# Patient Record
Sex: FEMALE | Race: WHITE | Employment: FULL TIME | URBAN - METROPOLITAN AREA
[De-identification: names, ages, dates, MRNs, and addresses within clinical notes are randomized per-mention and may not be internally consistent; named-entity substitution may affect disease eponyms.]

---

## 2017-10-16 ENCOUNTER — LAB ENCOUNTER (OUTPATIENT)
Dept: LAB | Age: 72
End: 2017-10-16
Attending: INTERNAL MEDICINE
Payer: MEDICARE

## 2017-10-16 DIAGNOSIS — R53.83 FATIGUE, UNSPECIFIED TYPE: ICD-10-CM

## 2017-10-16 DIAGNOSIS — Z12.31 ENCOUNTER FOR SCREENING MAMMOGRAM FOR MALIGNANT NEOPLASM OF BREAST: ICD-10-CM

## 2017-10-16 DIAGNOSIS — H00.025 HORDEOLUM INTERNUM OF LEFT LOWER EYELID: ICD-10-CM

## 2017-10-16 DIAGNOSIS — Z23 NEED FOR VACCINATION WITH 13-POLYVALENT PNEUMOCOCCAL CONJUGATE VACCINE: ICD-10-CM

## 2017-10-16 PROCEDURE — 80061 LIPID PANEL: CPT

## 2017-10-16 PROCEDURE — 83540 ASSAY OF IRON: CPT

## 2017-10-16 PROCEDURE — 36415 COLL VENOUS BLD VENIPUNCTURE: CPT

## 2017-10-16 PROCEDURE — 82607 VITAMIN B-12: CPT

## 2017-10-16 PROCEDURE — 84439 ASSAY OF FREE THYROXINE: CPT

## 2017-10-16 PROCEDURE — 84443 ASSAY THYROID STIM HORMONE: CPT

## 2017-10-16 PROCEDURE — 80053 COMPREHEN METABOLIC PANEL: CPT

## 2017-10-16 PROCEDURE — 84466 ASSAY OF TRANSFERRIN: CPT

## 2017-10-16 PROCEDURE — 82746 ASSAY OF FOLIC ACID SERUM: CPT

## 2017-10-16 PROCEDURE — 85025 COMPLETE CBC W/AUTO DIFF WBC: CPT

## 2017-10-17 NOTE — PROGRESS NOTES
[phone] ([place])   Spoke with patient and informed of MD instructions. Patient verbalized understanding.

## 2017-12-21 PROBLEM — J45.41 MODERATE PERSISTENT ASTHMA WITH ACUTE EXACERBATION: Status: ACTIVE | Noted: 2017-12-21

## 2018-04-05 PROCEDURE — 87086 URINE CULTURE/COLONY COUNT: CPT | Performed by: INTERNAL MEDICINE

## 2018-04-05 PROCEDURE — 87186 SC STD MICRODIL/AGAR DIL: CPT | Performed by: INTERNAL MEDICINE

## 2018-04-05 PROCEDURE — 87088 URINE BACTERIA CULTURE: CPT | Performed by: INTERNAL MEDICINE

## 2018-04-23 PROCEDURE — 87186 SC STD MICRODIL/AGAR DIL: CPT | Performed by: INTERNAL MEDICINE

## 2018-04-23 PROCEDURE — 87086 URINE CULTURE/COLONY COUNT: CPT | Performed by: INTERNAL MEDICINE

## 2018-04-23 PROCEDURE — 87088 URINE BACTERIA CULTURE: CPT | Performed by: INTERNAL MEDICINE

## 2018-05-14 ENCOUNTER — OFFICE VISIT (OUTPATIENT)
Dept: OBGYN CLINIC | Facility: CLINIC | Age: 73
End: 2018-05-14

## 2018-05-14 VITALS
BODY MASS INDEX: 29 KG/M2 | WEIGHT: 146 LBS | DIASTOLIC BLOOD PRESSURE: 82 MMHG | HEART RATE: 82 BPM | SYSTOLIC BLOOD PRESSURE: 132 MMHG

## 2018-05-14 DIAGNOSIS — N90.89 VULVAR IRRITATION: Primary | ICD-10-CM

## 2018-05-14 PROCEDURE — 99213 OFFICE O/P EST LOW 20 MIN: CPT | Performed by: OBSTETRICS & GYNECOLOGY

## 2018-05-14 NOTE — PROGRESS NOTES
HPI:    Patient ID: Leena Osborn is a 81year old female. HPI  GYN problem visit  Sent by her primary care physician Binta Cheung  Patient I have not seen in many years. Complains of vulvar irritation.   Approximately a month ago she began to have sympt VOMITING    HISTORY:  Past Medical History:   Diagnosis Date   • ASD (atrial septal defect)    • Asthma    • Essential hypertension    • Hypothyroid     due to Graves, treated with I131      Past Surgical History:  No date: HERNIA SURGERY  No date: HYSTERE

## 2019-01-07 PROBLEM — N90.89 VULVAR IRRITATION: Status: RESOLVED | Noted: 2018-05-14 | Resolved: 2019-01-07

## 2019-01-07 PROBLEM — J45.909 ASTHMA: Status: ACTIVE | Noted: 2019-01-07

## 2019-01-07 PROBLEM — J45.41 MODERATE PERSISTENT ASTHMA WITH ACUTE EXACERBATION: Status: RESOLVED | Noted: 2017-12-21 | Resolved: 2019-01-07

## 2019-01-07 PROBLEM — I70.0 AORTIC ATHEROSCLEROSIS (HCC): Status: ACTIVE | Noted: 2019-01-07

## 2019-10-04 PROBLEM — R53.83 FATIGUE, UNSPECIFIED TYPE: Status: ACTIVE | Noted: 2019-10-04

## 2019-12-09 PROBLEM — H53.9 VISUAL DISTURBANCES: Status: ACTIVE | Noted: 2019-12-09

## 2019-12-10 ENCOUNTER — LAB ENCOUNTER (OUTPATIENT)
Dept: LAB | Age: 74
End: 2019-12-10
Attending: INTERNAL MEDICINE
Payer: MEDICARE

## 2019-12-10 DIAGNOSIS — Z13.220 ENCOUNTER FOR SCREENING FOR LIPID DISORDER: ICD-10-CM

## 2019-12-10 DIAGNOSIS — R53.83 FATIGUE, UNSPECIFIED TYPE: ICD-10-CM

## 2019-12-10 DIAGNOSIS — I10 ESSENTIAL HYPERTENSION: ICD-10-CM

## 2019-12-10 PROCEDURE — 85652 RBC SED RATE AUTOMATED: CPT

## 2019-12-10 PROCEDURE — 80053 COMPREHEN METABOLIC PANEL: CPT

## 2019-12-10 PROCEDURE — 85025 COMPLETE CBC W/AUTO DIFF WBC: CPT

## 2019-12-10 PROCEDURE — 36415 COLL VENOUS BLD VENIPUNCTURE: CPT

## 2019-12-10 PROCEDURE — 86140 C-REACTIVE PROTEIN: CPT

## 2019-12-10 PROCEDURE — 80061 LIPID PANEL: CPT

## 2020-01-20 PROBLEM — Z87.74 H/O CONGENITAL ATRIAL SEPTAL DEFECT (ASD) REPAIR: Status: ACTIVE | Noted: 2020-01-20

## 2020-09-19 PROBLEM — E03.9 HYPOTHYROIDISM, UNSPECIFIED TYPE: Status: ACTIVE | Noted: 2020-09-19

## 2020-10-29 PROBLEM — Z87.74 HISTORY OF ATRIAL SEPTAL DEFECT REPAIR: Status: ACTIVE | Noted: 2020-01-20

## 2020-11-01 PROBLEM — R63.5 WEIGHT GAIN: Status: ACTIVE | Noted: 2020-11-01

## 2020-11-01 PROBLEM — E78.00 HIGH CHOLESTEROL: Status: ACTIVE | Noted: 2020-11-01

## 2020-11-01 PROBLEM — Z12.31 ENCOUNTER FOR SCREENING MAMMOGRAM FOR MALIGNANT NEOPLASM OF BREAST: Status: ACTIVE | Noted: 2020-11-01

## 2021-03-09 DIAGNOSIS — Z23 NEED FOR VACCINATION: ICD-10-CM

## 2021-04-01 PROBLEM — J44.89 CHRONIC OBSTRUCTIVE ASTHMA: Status: ACTIVE | Noted: 2019-01-07

## 2021-04-01 PROBLEM — J44.9 CHRONIC OBSTRUCTIVE ASTHMA (HCC): Status: ACTIVE | Noted: 2019-01-07

## 2021-07-09 ENCOUNTER — OFFICE VISIT (OUTPATIENT)
Dept: PODIATRY CLINIC | Facility: CLINIC | Age: 76
End: 2021-07-09
Payer: MEDICARE

## 2021-07-09 ENCOUNTER — HOSPITAL ENCOUNTER (OUTPATIENT)
Dept: GENERAL RADIOLOGY | Facility: HOSPITAL | Age: 76
Discharge: HOME | End: 2021-07-09
Attending: PODIATRIST
Payer: MEDICARE

## 2021-07-09 VITALS — HEIGHT: 63 IN | WEIGHT: 139.81 LBS | BODY MASS INDEX: 24.77 KG/M2

## 2021-07-09 DIAGNOSIS — M21.612 BUNION OF LEFT FOOT: Primary | ICD-10-CM

## 2021-07-09 DIAGNOSIS — M21.612 BUNION OF LEFT FOOT: ICD-10-CM

## 2021-07-09 PROCEDURE — 73630 X-RAY EXAM OF FOOT: CPT | Performed by: PODIATRIST

## 2021-07-09 PROCEDURE — 99203 OFFICE O/P NEW LOW 30 MIN: CPT | Performed by: PODIATRIST

## 2021-07-09 NOTE — PROGRESS NOTES
HPI:    Patient ID: Brittney Banerjee is a 78year old female. This 82-year-old female presents today having not been seen in many years.   I did surgery on her right bunionectomy many years she states that now the left is causing her significant pain and fru PHYSICAL EXAM:     Physical exam pulses are noted. There is no clinical evidence of edema nor erythema. There is no apparent neurologic deficit no evidence of weakness. Patient is moderately active and has no calf pain nor does she have night cramps.

## 2021-07-12 ENCOUNTER — TELEPHONE (OUTPATIENT)
Dept: PODIATRY CLINIC | Facility: CLINIC | Age: 76
End: 2021-07-12

## 2021-07-12 DIAGNOSIS — M20.12 ACQUIRED HALLUX VALGUS OF LEFT FOOT: Primary | ICD-10-CM

## 2021-07-12 NOTE — TELEPHONE ENCOUNTER
Received surgical scheduling request for patient for Bunionectomy, left foot.   Called patient this date and left a message for her to call me back directly at [phone]

## 2021-07-13 NOTE — TELEPHONE ENCOUNTER
Patient called in this date and said she cannot due 7/16/21 due to transportation. Procedure rescheduled for 7/21/21 at 11:00 a.m. at [facility]. Reviewed pre-op. Instructions and patient voiced understanding. Scheduled post op appts.   Placed order for manage

## 2021-07-13 NOTE — TELEPHONE ENCOUNTER
Received confirmation this morning and called patient and she may have trouble getting a ride that early so she is checking into her options and will call me back directly at [phone] to confirm.   If not, patient was told she can be rescheduled to 7/21

## 2021-07-13 NOTE — TELEPHONE ENCOUNTER
Patient called back yesterday and requested 7/16/21 which was scheduled at [facility]. Patient was told I would call her back with confirmation once it was received.

## 2021-07-23 ENCOUNTER — TELEPHONE (OUTPATIENT)
Dept: PODIATRY CLINIC | Facility: CLINIC | Age: 76
End: 2021-07-23

## 2021-07-23 NOTE — TELEPHONE ENCOUNTER
Per pt is complaining that her shoe is bothering her surgical site, pt had surgery on 7/21, requesting to speak to RN. Please call thank you.

## 2021-07-23 NOTE — TELEPHONE ENCOUNTER
S/w pt and she states she already s/w [facility] who answered her q's r/t PO shoe and that there is no left and right shoe but only a shoe that fits both right and left.  She states she thinks the shoe is a bit too small and her toes are hanging off and the velcr

## 2021-07-28 ENCOUNTER — OFFICE VISIT (OUTPATIENT)
Dept: PODIATRY CLINIC | Facility: CLINIC | Age: 76
End: 2021-07-28
Payer: MEDICARE

## 2021-07-28 DIAGNOSIS — M21.612 BUNION OF LEFT FOOT: Primary | ICD-10-CM

## 2021-07-28 PROCEDURE — 99024 POSTOP FOLLOW-UP VISIT: CPT | Performed by: PODIATRIST

## 2021-07-28 NOTE — PROGRESS NOTES
HPI:    Patient ID: Lou Au is a 78year old female. 45-year-old female presents 1 week post left bunionectomy. She required no pain medication throughout the procedure.   She states that she did a little bit too much yesterday while working in [employer]. There is no clinical evidence of edema nor erythema. A dry sterile dressing was reapplied and reinforced. Continue limited weightbearing elevation and ice.   See patient in 1 week for suture removal and x-ray         ASSESSMENT/PLAN:   Nathanael of

## 2021-08-04 ENCOUNTER — HOSPITAL ENCOUNTER (OUTPATIENT)
Dept: GENERAL RADIOLOGY | Facility: HOSPITAL | Age: 76
Discharge: HOME | End: 2021-08-04
Attending: PODIATRIST
Payer: MEDICARE

## 2021-08-04 ENCOUNTER — OFFICE VISIT (OUTPATIENT)
Dept: PODIATRY CLINIC | Facility: CLINIC | Age: 76
End: 2021-08-04
Payer: MEDICARE

## 2021-08-04 VITALS — BODY MASS INDEX: 23.57 KG/M2 | WEIGHT: 133 LBS | HEIGHT: 63 IN

## 2021-08-04 DIAGNOSIS — Z47.89 ORTHOPEDIC AFTERCARE: Primary | ICD-10-CM

## 2021-08-04 DIAGNOSIS — M21.612 BUNION OF LEFT FOOT: ICD-10-CM

## 2021-08-04 DIAGNOSIS — Z47.89 ORTHOPEDIC AFTERCARE: ICD-10-CM

## 2021-08-04 PROCEDURE — 73630 X-RAY EXAM OF FOOT: CPT | Performed by: PODIATRIST

## 2021-08-04 PROCEDURE — 99024 POSTOP FOLLOW-UP VISIT: CPT | Performed by: PODIATRIST

## 2021-08-04 NOTE — PROGRESS NOTES
HPI:    Patient ID: Tracey Lyon is a 78year old female. This 30-year-old female presents 2 weeks post bunionectomy.   She has no specific noted complaints or concerns and presently is happy with the status    ROS:              Current Outpatient Medi diagnosis)  Bunion of left foot    No orders of the defined types were placed in this encounter.       Meds This Visit:  Requested Prescriptions      No prescriptions requested or ordered in this encounter       Imaging & Referrals:  None       #[id number]

## 2021-08-13 ENCOUNTER — OFFICE VISIT (OUTPATIENT)
Dept: PODIATRY CLINIC | Facility: CLINIC | Age: 76
End: 2021-08-13
Payer: MEDICARE

## 2021-08-13 DIAGNOSIS — M21.612 BUNION OF LEFT FOOT: Primary | ICD-10-CM

## 2021-08-13 PROCEDURE — 99024 POSTOP FOLLOW-UP VISIT: CPT | Performed by: PODIATRIST

## 2021-08-13 NOTE — PROGRESS NOTES
HPI:    Patient ID: Ned Uriostegui is a 78year old female. 28-year-old female presents walk 3 weeks post left forefoot surgery with no specific noted complaints or concerns.       ROS:              Current Outpatient Medications   Medication Sig Dispense encounter       Imaging & Referrals:  None       QD#[id number]

## 2021-08-27 ENCOUNTER — OFFICE VISIT (OUTPATIENT)
Dept: PODIATRY CLINIC | Facility: CLINIC | Age: 76
End: 2021-08-27
Payer: MEDICARE

## 2021-08-27 VITALS — HEIGHT: 63 IN | WEIGHT: 135 LBS | BODY MASS INDEX: 23.92 KG/M2

## 2021-08-27 DIAGNOSIS — M21.612 BUNION OF LEFT FOOT: Primary | ICD-10-CM

## 2021-08-27 PROCEDURE — 99024 POSTOP FOLLOW-UP VISIT: CPT | Performed by: PODIATRIST

## 2021-08-27 NOTE — PROGRESS NOTES
HPI:    Patient ID: Travis Hall is a 78year old female. 68-year-old female presents postsurgical for left bunionectomy. Procedure was performed on July 21. She has no specific noted complaints or concerns.     ROS:              Current Outpatient HAYLIE encounter.       Meds This Visit:  Requested Prescriptions      No prescriptions requested or ordered in this encounter       Imaging & Referrals:  None       #[id number]

## 2021-10-06 ENCOUNTER — OFFICE VISIT (OUTPATIENT)
Dept: PODIATRY CLINIC | Facility: CLINIC | Age: 76
End: 2021-10-06
Payer: MEDICARE

## 2021-10-06 DIAGNOSIS — M21.612 BUNION OF LEFT FOOT: Primary | ICD-10-CM

## 2021-10-06 PROCEDURE — 99024 POSTOP FOLLOW-UP VISIT: CPT | Performed by: PODIATRIST

## 2021-10-06 NOTE — PROGRESS NOTES
HPI:    Patient ID: Lissa Ceja is a 78year old female. A 5year-old female presents postsurgical just over 2 months in reference to the left foot. She is a bit frustrated with swelling she has some discomfort but not on a daily basis.     ROS: defined types were placed in this encounter.       Meds This Visit:  Requested Prescriptions      No prescriptions requested or ordered in this encounter       Imaging & Referrals:  None       #[id number]

## 2021-11-09 ENCOUNTER — OFFICE VISIT (OUTPATIENT)
Dept: PODIATRY CLINIC | Facility: CLINIC | Age: 76
End: 2021-11-09
Payer: MEDICARE

## 2021-11-09 DIAGNOSIS — M21.612 BUNION OF LEFT FOOT: Primary | ICD-10-CM

## 2021-11-09 PROCEDURE — 99024 POSTOP FOLLOW-UP VISIT: CPT | Performed by: PODIATRIST

## 2021-11-09 NOTE — PROGRESS NOTES
HPI:    Patient ID: Corrine Mirza is a 78year old female. This 30-year-old female presents postsurgical for left bunionectomy.   She states she is not having any further concerns and very happy with the end result she has returned to all activities and w

## 2022-08-09 ENCOUNTER — OFFICE VISIT (OUTPATIENT)
Dept: OBGYN CLINIC | Facility: CLINIC | Age: 77
End: 2022-08-09
Payer: MEDICARE

## 2022-08-09 VITALS — BODY MASS INDEX: 25 KG/M2 | SYSTOLIC BLOOD PRESSURE: 130 MMHG | DIASTOLIC BLOOD PRESSURE: 86 MMHG | WEIGHT: 141 LBS

## 2022-08-09 DIAGNOSIS — K40.90 INGUINAL HERNIA, RIGHT: Primary | ICD-10-CM

## 2022-08-09 PROCEDURE — 99203 OFFICE O/P NEW LOW 30 MIN: CPT | Performed by: OBSTETRICS & GYNECOLOGY

## 2022-08-26 ENCOUNTER — ANESTHESIA (OUTPATIENT)
Dept: SURGERY | Facility: HOSPITAL | Age: 77
End: 2022-08-26
Payer: MEDICARE

## 2022-08-26 ENCOUNTER — HOSPITAL ENCOUNTER (OUTPATIENT)
Facility: HOSPITAL | Age: 77
Setting detail: HOSPITAL OUTPATIENT SURGERY
Discharge: HOME | End: 2022-08-26
Attending: SURGERY | Admitting: SURGERY
Payer: MEDICARE

## 2022-08-26 ENCOUNTER — ANESTHESIA EVENT (OUTPATIENT)
Dept: SURGERY | Facility: HOSPITAL | Age: 77
End: 2022-08-26
Payer: MEDICARE

## 2022-08-26 VITALS
WEIGHT: 138.88 LBS | DIASTOLIC BLOOD PRESSURE: 67 MMHG | HEIGHT: 63 IN | BODY MASS INDEX: 24.61 KG/M2 | OXYGEN SATURATION: 97 % | HEART RATE: 68 BPM | RESPIRATION RATE: 16 BRPM | SYSTOLIC BLOOD PRESSURE: 117 MMHG | TEMPERATURE: 97 F

## 2022-08-26 DIAGNOSIS — Z01.818 PRE-OP TESTING: Primary | ICD-10-CM

## 2022-08-26 PROBLEM — K40.90 RIGHT INGUINAL HERNIA: Status: ACTIVE | Noted: 2022-08-09

## 2022-08-26 PROCEDURE — 0YU50JZ SUPPLEMENT RIGHT INGUINAL REGION WITH SYNTHETIC SUBSTITUTE, OPEN APPROACH: ICD-10-PCS | Performed by: SURGERY

## 2022-08-26 DEVICE — BARD MESH PERFIX PLUG, SMALL
Type: IMPLANTABLE DEVICE | Site: INGUINAL | Status: FUNCTIONAL
Brand: BARD MESH PERFIX PLUG

## 2022-08-26 RX ORDER — SODIUM CHLORIDE, SODIUM LACTATE, POTASSIUM CHLORIDE, CALCIUM CHLORIDE 600; 310; 30; 20 MG/100ML; MG/100ML; MG/100ML; MG/100ML
INJECTION, SOLUTION INTRAVENOUS CONTINUOUS
Status: DISCONTINUED | OUTPATIENT
Start: 2022-08-26 | End: 2022-08-26

## 2022-08-26 RX ORDER — HYDROMORPHONE HYDROCHLORIDE 1 MG/ML
0.2 INJECTION, SOLUTION INTRAMUSCULAR; INTRAVENOUS; SUBCUTANEOUS EVERY 5 MIN PRN
Status: DISCONTINUED | OUTPATIENT
Start: 2022-08-26 | End: 2022-08-26

## 2022-08-26 RX ORDER — ONDANSETRON 2 MG/ML
INJECTION INTRAMUSCULAR; INTRAVENOUS AS NEEDED
Status: DISCONTINUED | OUTPATIENT
Start: 2022-08-26 | End: 2022-08-26 | Stop reason: SURG

## 2022-08-26 RX ORDER — HYDROMORPHONE HYDROCHLORIDE 1 MG/ML
0.6 INJECTION, SOLUTION INTRAMUSCULAR; INTRAVENOUS; SUBCUTANEOUS EVERY 5 MIN PRN
Status: DISCONTINUED | OUTPATIENT
Start: 2022-08-26 | End: 2022-08-26

## 2022-08-26 RX ORDER — MORPHINE SULFATE 10 MG/ML
6 INJECTION, SOLUTION INTRAMUSCULAR; INTRAVENOUS EVERY 10 MIN PRN
OUTPATIENT
Start: 2022-08-26

## 2022-08-26 RX ORDER — NALOXONE HYDROCHLORIDE 0.4 MG/ML
80 INJECTION, SOLUTION INTRAMUSCULAR; INTRAVENOUS; SUBCUTANEOUS AS NEEDED
Status: DISCONTINUED | OUTPATIENT
Start: 2022-08-26 | End: 2022-08-26

## 2022-08-26 RX ORDER — MORPHINE SULFATE 10 MG/ML
6 INJECTION, SOLUTION INTRAMUSCULAR; INTRAVENOUS EVERY 10 MIN PRN
Status: DISCONTINUED | OUTPATIENT
Start: 2022-08-26 | End: 2022-08-26

## 2022-08-26 RX ORDER — MORPHINE SULFATE 4 MG/ML
4 INJECTION, SOLUTION INTRAMUSCULAR; INTRAVENOUS EVERY 10 MIN PRN
Status: DISCONTINUED | OUTPATIENT
Start: 2022-08-26 | End: 2022-08-26

## 2022-08-26 RX ORDER — LIDOCAINE HYDROCHLORIDE 10 MG/ML
INJECTION, SOLUTION EPIDURAL; INFILTRATION; INTRACAUDAL; PERINEURAL AS NEEDED
Status: DISCONTINUED | OUTPATIENT
Start: 2022-08-26 | End: 2022-08-26 | Stop reason: SURG

## 2022-08-26 RX ORDER — MORPHINE SULFATE 4 MG/ML
2 INJECTION, SOLUTION INTRAMUSCULAR; INTRAVENOUS EVERY 10 MIN PRN
OUTPATIENT
Start: 2022-08-26

## 2022-08-26 RX ORDER — HYDROMORPHONE HYDROCHLORIDE 1 MG/ML
0.6 INJECTION, SOLUTION INTRAMUSCULAR; INTRAVENOUS; SUBCUTANEOUS EVERY 5 MIN PRN
OUTPATIENT
Start: 2022-08-26

## 2022-08-26 RX ORDER — MORPHINE SULFATE 4 MG/ML
2 INJECTION, SOLUTION INTRAMUSCULAR; INTRAVENOUS EVERY 10 MIN PRN
Status: DISCONTINUED | OUTPATIENT
Start: 2022-08-26 | End: 2022-08-26

## 2022-08-26 RX ORDER — ACETAMINOPHEN 500 MG
1000 TABLET ORAL ONCE
Status: COMPLETED | OUTPATIENT
Start: 2022-08-26 | End: 2022-08-26

## 2022-08-26 RX ORDER — HYDROMORPHONE HYDROCHLORIDE 1 MG/ML
0.2 INJECTION, SOLUTION INTRAMUSCULAR; INTRAVENOUS; SUBCUTANEOUS EVERY 5 MIN PRN
OUTPATIENT
Start: 2022-08-26

## 2022-08-26 RX ORDER — DEXAMETHASONE SODIUM PHOSPHATE 4 MG/ML
VIAL (ML) INJECTION AS NEEDED
Status: DISCONTINUED | OUTPATIENT
Start: 2022-08-26 | End: 2022-08-26 | Stop reason: SURG

## 2022-08-26 RX ORDER — ACETAMINOPHEN AND CODEINE PHOSPHATE 300; 30 MG/1; MG/1
1 TABLET ORAL EVERY 6 HOURS PRN
Qty: 20 TABLET | Refills: 0 | Status: SHIPPED | OUTPATIENT
Start: 2022-08-26

## 2022-08-26 RX ORDER — NALOXONE HYDROCHLORIDE 0.4 MG/ML
80 INJECTION, SOLUTION INTRAMUSCULAR; INTRAVENOUS; SUBCUTANEOUS AS NEEDED
OUTPATIENT
Start: 2022-08-26 | End: 2022-08-26

## 2022-08-26 RX ORDER — ACETAMINOPHEN AND CODEINE PHOSPHATE 300; 30 MG/1; MG/1
1 TABLET ORAL ONCE
Status: COMPLETED | OUTPATIENT
Start: 2022-08-26 | End: 2022-08-26

## 2022-08-26 RX ORDER — SODIUM CHLORIDE, SODIUM LACTATE, POTASSIUM CHLORIDE, CALCIUM CHLORIDE 600; 310; 30; 20 MG/100ML; MG/100ML; MG/100ML; MG/100ML
INJECTION, SOLUTION INTRAVENOUS CONTINUOUS
OUTPATIENT
Start: 2022-08-26

## 2022-08-26 RX ORDER — MORPHINE SULFATE 4 MG/ML
4 INJECTION, SOLUTION INTRAMUSCULAR; INTRAVENOUS EVERY 10 MIN PRN
OUTPATIENT
Start: 2022-08-26

## 2022-08-26 RX ORDER — HYDROMORPHONE HYDROCHLORIDE 1 MG/ML
0.4 INJECTION, SOLUTION INTRAMUSCULAR; INTRAVENOUS; SUBCUTANEOUS EVERY 5 MIN PRN
OUTPATIENT
Start: 2022-08-26

## 2022-08-26 RX ORDER — EPHEDRINE SULFATE 50 MG/ML
INJECTION INTRAVENOUS AS NEEDED
Status: DISCONTINUED | OUTPATIENT
Start: 2022-08-26 | End: 2022-08-26 | Stop reason: SURG

## 2022-08-26 RX ORDER — CEFAZOLIN SODIUM/WATER 2 G/20 ML
2 SYRINGE (ML) INTRAVENOUS ONCE
Status: COMPLETED | OUTPATIENT
Start: 2022-08-26 | End: 2022-08-26

## 2022-08-26 RX ORDER — HYDROMORPHONE HYDROCHLORIDE 1 MG/ML
0.4 INJECTION, SOLUTION INTRAMUSCULAR; INTRAVENOUS; SUBCUTANEOUS EVERY 5 MIN PRN
Status: DISCONTINUED | OUTPATIENT
Start: 2022-08-26 | End: 2022-08-26

## 2022-08-26 RX ADMIN — ONDANSETRON 4 MG: 2 INJECTION INTRAMUSCULAR; INTRAVENOUS at 14:44:00

## 2022-08-26 RX ADMIN — EPHEDRINE SULFATE 10 MG: 50 INJECTION INTRAVENOUS at 14:44:00

## 2022-08-26 RX ADMIN — CEFAZOLIN SODIUM/WATER 2 G: 2 G/20 ML SYRINGE (ML) INTRAVENOUS at 14:35:00

## 2022-08-26 RX ADMIN — EPHEDRINE SULFATE 10 MG: 50 INJECTION INTRAVENOUS at 14:29:00

## 2022-08-26 RX ADMIN — SODIUM CHLORIDE, SODIUM LACTATE, POTASSIUM CHLORIDE, CALCIUM CHLORIDE: 600; 310; 30; 20 INJECTION, SOLUTION INTRAVENOUS at 14:08:00

## 2022-08-26 RX ADMIN — SODIUM CHLORIDE, SODIUM LACTATE, POTASSIUM CHLORIDE, CALCIUM CHLORIDE: 600; 310; 30; 20 INJECTION, SOLUTION INTRAVENOUS at 15:21:00

## 2022-08-26 RX ADMIN — LIDOCAINE HYDROCHLORIDE 50 MG: 10 INJECTION, SOLUTION EPIDURAL; INFILTRATION; INTRACAUDAL; PERINEURAL at 14:20:00

## 2022-08-26 RX ADMIN — DEXAMETHASONE SODIUM PHOSPHATE 8 MG: 4 MG/ML VIAL (ML) INJECTION at 14:44:00

## 2022-08-26 NOTE — BRIEF OP NOTE
Pre-Operative Diagnosis: Right inguinal hernia     Post-Operative Diagnosis: Right inguinal hernia indirect     Procedure Performed:   Open repair of right inguinal hernia with mesh    Surgeon(s) and Role:     * Tejinder Taylor MD - Primary    Assistant(s):  Surgical Assistant.: Alysa Clark CSA     Surgical Findings: indirect    Specimen: none     Estimated Blood Loss: No data recorded    Dictation Number: [id number]    Jyotsna Benson MD  8/26/2022  3:37 PM

## 2022-08-26 NOTE — ANESTHESIA PROCEDURE NOTES
Airway  Date/Time: 8/26/2022 2:21 PM  Urgency: Elective      General Information and Staff    Patient location during procedure: OR  Anesthesiologist: Al To MD  Performed: anesthesiologist     Indications and Patient Condition  Indications for airway management: anesthesia  Sedation level: deep  Preoxygenated: yes  Patient position: sniffing  Mask difficulty assessment: 1 - vent by mask    Final Airway Details  Final airway type: supraglottic airway      Successful airway: classic  Size 4      Number of attempts at approach: 1  Ventilation between attempts: none  Number of other approaches attempted: 0

## 2022-08-26 NOTE — DISCHARGE SUMMARY
[facility]  Discharge Summary    Alf Flynn Patient Status:  Hospital Outpatient Surgery    1945 MRN [id number]   Location [place] [address] Attending Pamela Drake MD   Hosp Day # 0 PCP Lenny Collier MD     Date of Admission: 2022    Date of Discharge: 2022      Admitting Diagnosis: Right inguinal hernia    Discharge Diagnosis: Patient Active Problem List:     Essential hypertension     Aortic atherosclerosis (HCC)     Chronic obstructive asthma (HCC)     Fatigue, unspecified type     Visual disturbances     History of atrial septal defect repair     Hypothyroidism, unspecified type     Encounter for screening mammogram for malignant neoplasm of breast     Weight gain     High cholesterol     Mild mitral regurgitation     Right inguinal hernia        Procedures:  Repair of right inguinal hernia    Complications: none    Disposition: Final discharge disposition not confirmed    Discharge Condition: Good    Discharge Medications: Current Discharge Medication List    START taking these medications    acetaminophen-codeine 300-30 MG Oral Tab  Take 1 tablet by mouth every 6 (six) hours as needed for Pain. Qty: 20 tablet Refills: 0      CONTINUE these medications which have NOT CHANGED    NON FORMULARY  1 tablet every morning. Changar by Bette Tejeda    LEVOTHYROXINE 50 MCG Oral Tab  TAKE ONE TABLET BY MOUTH ONE TIME DAILY  Qty: 90 tablet Refills: 0    AMLODIPINE 2.5 MG Oral Tab  TAKE ONE TABLET BY MOUTH ONE TIME DAILY  Qty: 90 tablet Refills: 0    Acidophilus/Pectin Oral Cap  Take 2 capsules by mouth daily. Please give brand [employer]  Qty: 180 capsule Refills: 1  Associated Diagnoses:Fatigue, unspecified type    B Complex Oral Tab  Take 1 tablet by mouth daily. Qty: 90 tablet Refills: 0  Associated Diagnoses:Fatigue, unspecified type    theophylline 300 MG Oral Tablet 12 Hr  Take 1 tablet (300 mg total) by mouth daily.   Qty: 90 tablet Refills: 0  Associated Diagnoses:Mild intermittent asthma without complication    cyclobenzaprine 10 MG Oral Tab  Take 1 tablet (10 mg total) by mouth 3 (three) times daily as needed for Muscle spasms. Qty: 40 tablet Refills: 1    DHEA 25 MG Oral Cap  Take 25 mg by mouth 2 (two) times a day. Qty: 180 capsule Refills: 0  Associated Diagnoses:Fatigue, unspecified type          Follow up Visits:  Follow-up with Alf Gray MD 6 to 11 days        Felicity Wolf MD  8/26/2022  3:44 PM

## 2022-08-27 NOTE — OPERATIVE REPORT
[facility]    PATIENT'S NAME: Catherine Ramirez   ATTENDING PHYSICIAN: Fabian Ortiz MD   OPERATING PHYSICIAN: Fabian Ortiz MD   PATIENT ACCOUNT#:   [id number]    LOCATION:  [place] [address]  MEDICAL RECORD #:   [id number]       YOB: 1945  ADMISSION DATE:       08/26/2022      OPERATION DATE:  08/26/2022    OPERATIVE REPORT    PREOPERATIVE DIAGNOSIS:  Right inguinal hernia. POSTOPERATIVE DIAGNOSIS:  Right indirect inguinal hernia. PROCEDURE:  Open repair of right inguinal hernia with small plug mesh. ASSISTANT:  Robby Love CSA, needed for the entire procedure for opening, closing, retracting, help placing the mesh. INDICATIONS:  Patient is a very pleasant 70-year-old female who has a symptomatic right inguinal hernia. She had a previous left inguinal hernia repair, wanted to proceed with open repair as recommended. I explained risks, possible complications, patient asked to proceed. OPERATIVE TECHNIQUE:  Patient was prepped and draped in usual sterile fashion. Incision was made superior to the right inguinal ligament, taken down external oblique found. External ring found, the fibers actually open. The ilioinguinal nerve was protected. The hypogastric nerves were left superiorly. We were around the cord, we lifted it up. We found the round ligament, which we divided. The indirect hernia and the lipomatous cord structures were all inverted. We then placed a small plug in there, opened this up with the Vicryl sutures underneath the edges of the muscle and then closed over this with interrupted 0 Nurolon from Poupart ligament closing the lateral defect. Hemostasis carefully assured. The flat mesh was cut to the appropriate size and tacked along Poupart ligament and the conjoined tendon just covering the area nicely. It was cut a little bit shorter since it was so far laterally and tacked this in. It sat well.   The iliohypogastric nerve was left superiorly and the ilioinguinal nerves left underneath the mesh. External oblique was closed with running 2-0 PDS, 3-0 plain for subcutaneous tissue, skin closed with 4-0 Vicryl and Dermabond. Went to Recovery in good condition. Dictated By Alison Srinivasan MD  d: 08/26/2022 15:39:56  t: 08/26/2022 20:46:01  Job [id number]  MARKY/

## 2022-09-27 ENCOUNTER — TELEPHONE (OUTPATIENT)
Dept: PODIATRY CLINIC | Facility: CLINIC | Age: 77
End: 2022-09-27

## 2022-09-27 NOTE — TELEPHONE ENCOUNTER
Pt calling for a refill on the following medication states on her legs states spasms because she has to go up and down so many stairs daily  please advise       cyclobenzaprine 10 MG

## 2022-10-13 ENCOUNTER — OFFICE VISIT (OUTPATIENT)
Dept: PODIATRY CLINIC | Facility: CLINIC | Age: 77
End: 2022-10-13
Payer: MEDICARE

## 2022-10-13 DIAGNOSIS — M20.42 HAMMERTOE OF LEFT FOOT: Primary | ICD-10-CM

## 2022-10-13 PROCEDURE — 99213 OFFICE O/P EST LOW 20 MIN: CPT | Performed by: PODIATRIST

## (undated) DEVICE — SUT VICRYL 0 CT-1 J946H

## (undated) DEVICE — VIOLET BRAIDED (POLYGLACTIN 910), SYNTHETIC ABSORBABLE SUTURE: Brand: COATED VICRYL

## (undated) DEVICE — MEGADYNE E-Z CLEAN BLADE 2.75"

## (undated) DEVICE — PENROSE DRAIN 12" X 1/4: Brand: CARDINAL HEALTH

## (undated) DEVICE — MINOR GENERAL: Brand: MEDLINE INDUSTRIES, INC.

## (undated) DEVICE — SUT PLAIN GUT 3-0 CT-1 842H

## (undated) DEVICE — DRAPE SHEET TRANSVERSE LAP

## (undated) DEVICE — ENCORE® LATEX MICRO SIZE 8, STERILE LATEX POWDER-FREE SURGICAL GLOVE: Brand: ENCORE

## (undated) DEVICE — 3M™ IOBAN™ 2 ANTIMICROBIAL INCISE DRAPE 6650EZ: Brand: IOBAN™ 2

## (undated) DEVICE — SUT PDS II 2-0 CT-2 Z333H

## (undated) DEVICE — SUT CHROMIC 2-0 SG13T

## (undated) DEVICE — ENCORE® LATEX ACCLAIM SIZE 8, STERILE LATEX POWDER-FREE SURGICAL GLOVE: Brand: ENCORE

## (undated) DEVICE — SOLUTION  .9 1000ML BTL

## (undated) DEVICE — SUT NUROLON 0 MO-6 C545G